# Patient Record
Sex: FEMALE | URBAN - METROPOLITAN AREA
[De-identification: names, ages, dates, MRNs, and addresses within clinical notes are randomized per-mention and may not be internally consistent; named-entity substitution may affect disease eponyms.]

---

## 2024-08-14 ENCOUNTER — LAB REQUISITION (OUTPATIENT)
Dept: LAB | Facility: HOSPITAL | Age: 29
End: 2024-08-14

## 2024-08-14 DIAGNOSIS — N89.9 NONINFLAMMATORY DISORDER OF VAGINA, UNSPECIFIED: ICD-10-CM

## 2024-08-14 PROCEDURE — 87086 URINE CULTURE/COLONY COUNT: CPT

## 2024-08-15 LAB
BACTERIA UR CULT: NORMAL
CLUE CELLS VAG LPF-#/AREA: PRESENT /[LPF]
NUGENT SCORE: 8
YEAST VAG WET PREP-#/AREA: ABNORMAL

## 2024-12-26 ENCOUNTER — OFFICE VISIT (OUTPATIENT)
Dept: URGENT CARE | Age: 29
End: 2024-12-26
Payer: COMMERCIAL

## 2024-12-26 VITALS
SYSTOLIC BLOOD PRESSURE: 114 MMHG | TEMPERATURE: 98.7 F | HEART RATE: 103 BPM | RESPIRATION RATE: 16 BRPM | DIASTOLIC BLOOD PRESSURE: 65 MMHG | OXYGEN SATURATION: 100 %

## 2024-12-26 DIAGNOSIS — R05.1 ACUTE COUGH: ICD-10-CM

## 2024-12-26 DIAGNOSIS — J01.90 ACUTE SINUSITIS, RECURRENCE NOT SPECIFIED, UNSPECIFIED LOCATION: Primary | ICD-10-CM

## 2024-12-26 RX ORDER — AMOXICILLIN AND CLAVULANATE POTASSIUM 875; 125 MG/1; MG/1
875 TABLET, FILM COATED ORAL 2 TIMES DAILY
Qty: 14 TABLET | Refills: 0 | Status: SHIPPED | OUTPATIENT
Start: 2024-12-26 | End: 2025-01-02

## 2024-12-26 RX ORDER — BENZONATATE 200 MG/1
200 CAPSULE ORAL 3 TIMES DAILY PRN
Qty: 21 CAPSULE | Refills: 0 | Status: SHIPPED | OUTPATIENT
Start: 2024-12-26 | End: 2025-01-02

## 2024-12-26 NOTE — PROGRESS NOTES
Subjective   Patient ID: Tayler Chapman is a 29 y.o. female. They present today with a chief complaint of Headache and Nasal Congestion.    History of Present Illness  Tayler is a 29-year-old female who presents to the urgent care for evaluation of cough, sinus headache, Sinus congestion and nasal congestion for about a week.  Patient states she typically gets sinus infections that presents similar to this however now she feels the sinus congestion is causing her to have sinus headaches and some neck tension.  Patient denies any neck stiffness or visual disturbances or light sensitivity however.  Patient denies of fever or chest pain or difficulty breathing.  Patient notes sinus drainage leading to throat irritation and cough.  Patient is requesting evaluation reassurance.  Patient has attempted use of Flonase with minimal improvement of symptoms.  No other symptoms or concerns otherwise reported.    Past Medical History  Allergies as of 12/26/2024    (No Known Allergies)       (Not in a hospital admission)       No past medical history on file.    No past surgical history on file.         Review of Systems  A 10-point review of systems was performed, otherwise unremarkable unless stated in the history of present illness.                Objective    Vitals:    12/26/24 0857   BP: 114/65   Pulse: 103   Resp: 16   Temp: 37.1 °C (98.7 °F)   SpO2: 100%     No LMP recorded.    Gen: Vitals noted and reviewed, no evidence of acute distress, well developed and afebrile.   Psych: Mood and affect appropriate for setting.  Head/Face: Atraumatic and normocephalic.   Neuro: No focal deficits noted.  ENT: TMs clear bilaterally, EACs unremarkable. Mastoids non-tender. Posterior oropharynx without erythema, exudate, or swelling. Uvula is in the midline and non-edematous.   Neck: Supple. No meningismus through full range of motion. No lymphadenopathy.   Cardiac: Regular rate and rhythm no murmur.   Lungs: Clear to auscultation  throughout, No evidence of wheezing, rhonchi or crackles. Good aeration throughout.   Extremities: Symmetrical, No peripheral edema  Skin: Without evidence of ecchymosis, wounds, or rashes.      Point of Care Test & Imaging Results from this visit  No results found for this visit on 12/26/24.   No results found.    Diagnostic study results (if any) were reviewed by [facility].    Assessment/Plan   Allergies, medications, history, and pertinent labs/EKGs/Imaging reviewed by James Ying DO.     Medical Decision Making  Discussed with the patient symptoms and clinical presentation findings suggestive of an acute sinusitis likely secondary viral infection of unclear etiology.  We advise close symptom monitoring supportive treatment.  However given the patient's duration of symptoms and lack of improvement with supportive care we agreed to initiate antimicrobial coverage and prescribed Augmentin.  We also prescribed Tessalon Perles to aid in cough symptom relief. Follow up with PCP. We advised seeking immediate emergency medical attention if symptoms fail to improve, worsen or any concerning symptoms arise. Patient voiced full understanding and agreement to plan.      Orders and Diagnoses  Diagnoses and all orders for this visit:  Acute sinusitis, recurrence not specified, unspecified location  -     benzonatate (Tessalon) 200 mg capsule; Take 1 capsule (200 mg) by mouth 3 times a day as needed for cough for up to 7 days. Do not crush or chew.  -     amoxicillin-pot clavulanate (Augmentin) 875-125 mg tablet; Take 1 tablet (875 mg) by mouth 2 times a day for 7 days. Take with food.  Acute cough  -     benzonatate (Tessalon) 200 mg capsule; Take 1 capsule (200 mg) by mouth 3 times a day as needed for cough for up to 7 days. Do not crush or chew.  -     amoxicillin-pot clavulanate (Augmentin) 875-125 mg tablet; Take 1 tablet (875 mg) by mouth 2 times a day for 7 days. Take with food.      Medical Admin  Record      Patient disposition: Home    Electronically signed by [facility]  9:23 AM

## 2025-01-01 ENCOUNTER — OFFICE VISIT (OUTPATIENT)
Dept: URGENT CARE | Age: 30
End: 2025-01-01
Payer: COMMERCIAL

## 2025-01-01 VITALS
HEART RATE: 98 BPM | DIASTOLIC BLOOD PRESSURE: 82 MMHG | SYSTOLIC BLOOD PRESSURE: 132 MMHG | RESPIRATION RATE: 16 BRPM | TEMPERATURE: 98.7 F | OXYGEN SATURATION: 96 %

## 2025-01-01 DIAGNOSIS — N76.0 ACUTE VAGINITIS: ICD-10-CM

## 2025-01-01 DIAGNOSIS — Z11.3 ROUTINE SCREENING FOR STI (SEXUALLY TRANSMITTED INFECTION): ICD-10-CM

## 2025-01-01 DIAGNOSIS — Z71.1 CONCERN ABOUT STD IN FEMALE WITHOUT DIAGNOSIS: Primary | ICD-10-CM

## 2025-01-01 LAB
POC BILIRUBIN, URINE: NEGATIVE
POC BLOOD, URINE: NEGATIVE
POC GLUCOSE, URINE: NEGATIVE MG/DL
POC KETONES, URINE: ABNORMAL MG/DL
POC LEUKOCYTES, URINE: NEGATIVE
POC NITRITE,URINE: NEGATIVE
POC PH, URINE: 6 PH
POC PROTEIN, URINE: NEGATIVE MG/DL
POC SPECIFIC GRAVITY, URINE: 1.02
POC UROBILINOGEN, URINE: 0.2 EU/DL
PREGNANCY TEST URINE, POC: NEGATIVE

## 2025-01-01 PROCEDURE — 87591 N.GONORRHOEAE DNA AMP PROB: CPT

## 2025-01-01 PROCEDURE — 87661 TRICHOMONAS VAGINALIS AMPLIF: CPT

## 2025-01-01 PROCEDURE — 87491 CHLMYD TRACH DNA AMP PROBE: CPT

## 2025-01-01 RX ORDER — FLUCONAZOLE 150 MG/1
150 TABLET ORAL ONCE
Qty: 1 TABLET | Refills: 0 | Status: SHIPPED | OUTPATIENT
Start: 2025-01-01 | End: 2025-01-01

## 2025-01-01 NOTE — PATIENT INSTRUCTIONS
Concern for STD    -If not already treated, you will be notified of any positive test result from today's visit.  -Pending test results should be available in 48 to 72 hours.  -You can also view your results online via [facility]    PLAN:  1. If prescribed medication, you must remain abstinent for 10 days after beginning the antibiotic, or you can reinfect yourself or your partner.  2.  If not already treated, you will be notified of any positive test result from your visit.  3. If you do not improve or you begin to have worsening symptoms please follow-up with your primary care physician  4. Please see your primary care physician if you would like to be tested for other diseases such as syphilis, HIV, AIDS, and hepatitis.  5. Return to the emergency department if you develop worsening of symptoms: Pain in your pelvis or lower abdomen, Fever or chills, or continued discharge.    Note:  IF you have been prescribed metronidazole.  Do not drink alcoholic beverages with this medication because it will cause nausea and vomiting.    Call your doctor or go to the emergency department for worsening symptoms.  -Fever begins or worsens  -If you are feeling more ill  -If you develop back or flank pain.  -Unable to urinate  -New or worsening abdominal pain    Sexually-transmitted diseases:    -Sexually transmitted diseases may be caused by bacteria, viruses, or protozoa.  -Some infections can be spread through kissing or close body contact.  -Some infections may spread to other parts of the body, sometimes with serious consequences.  -Using condoms can help prevent these infections.  -Most sexually transmitted diseases can be effectively treated with drugs.  -Sexual intercourse provides an easy opportunity for organisms to spread (be transmitted) from one person to another because it involves close contact and transfer of genital and other body fluids.  -Sexually transmitted diseases (STDs), also called sexually transmitted  infections (STIs), are relatively common. For example, an estimated 20 million new cases of STDs occur each year in the [place]. About half of the new cases occur in people aged 15 to 24 years  -Most STDs can be effectively treated with drugs (antibiotics for bacterial infections and antiviral drugs for viral infections). However, some new strains of bacteria and viruses have become resistant to some drugs, making treatment more difficult. Resistance to drugs  is likely to increase because drugs are sometimes misused.  -People who are being treated for a bacterial STD should abstain from sexual intercourse until the infection has been eliminated from them and their sex partners.   -Thus, sex partners should be tested and treated simultaneously.  -Viral STDs, especially herpes and HIV infection, usually persist for life. Antiviral drugs can control, but not yet cure these infections.    Symptoms of sexually transmitted infection:  Genital itching, burning, sores, discharge.  Be aware that many STIs do not cause any symptoms.      Prevention of STI's:   The best way to know for sure is to be tested.  There is no sure way to prevent all STIs, but there are things you can do to reduce your chances of catching 1.  The most important thing you can do is wear condoms every time you have sex.  Ask your doctor if there are any vaccines you should have.  If you are 26 years or younger you can get a vaccine to protect against HPV.  HPV causes genital warts and sometimes cancer.  If you do not have hepatitis A or B and not have gotten a vaccine, you can get those vaccines, too.  Ask your doctor if there are any vaccines he or she recommends.

## 2025-01-01 NOTE — PROGRESS NOTES
Subjective   Patient ID: Tayler Chapman is a 29 y.o. female. They present today with a chief complaint of STI Screening.    CC:  Concern for STD    HPI: This is a 29-year-old female presenting for concern for an STD.  Reports slight vaginal irritation and itching since taking amoxicillin about a week ago.  She is denying having discharge but would like to be tested for STD.  No dysuria, hematuria, urinary frequency or urgency.  No vaginal lesions.  No nausea or vomiting.  No abdominal pain.  No no fever.  No other concerns or complaints.    Past Medical History  Allergies as of 01/01/2025    (No Known Allergies)       (Not in a hospital admission)      No past medical history on file.    No past surgical history on file.         Review of Systems  Review of Systems    After reviewing all body systems I have documented pertinent findings above in the history.  All other Systems reviewed and are negative for complaint.  Pertinent positive and negatives are listed in the above HPI.    Objective    Vitals:    01/01/25 1042   BP: 132/82   Pulse: 98   Resp: 16   Temp: 37.1 °C (98.7 °F)   SpO2: 96%     No LMP recorded.    Physical Exam    EXAM:  General: No acute distress. Well developed, well nourished.   Skin: Skin is warm, and dry. No rashes or lesions. Nailbeds pink.  Cardiac: Regular rate  Respiratory:  No acute respiratory distress.  Regular rate of breathing  Abdomen:  Soft, NT/ND.  No CVA tenderness.    Genital/Rectal: Deferred.  Patient denies having rash or lesions of the genitalia.    MSK: Good mobility and strength of the extremities.  No spasm.   Neurological: A&Ox4. Normal speech, steady gait.   Psych:  Normal affect.  Cooperative.      Procedures  Point of Care Test & Imaging Results from this visit  Results for orders placed or performed in visit on 01/01/25   POCT UA Automated manually resulted    Collection Time: 01/01/25 10:48 AM   Result Value Ref Range    POC Glucose, Urine NEGATIVE NEGATIVE mg/dl     POC Bilirubin, Urine NEGATIVE NEGATIVE    POC Ketones, Urine TRACE (A) NEGATIVE mg/dl    POC Specific Gravity, Urine 1.025 1.005 - 1.035    POC Blood, Urine NEGATIVE NEGATIVE    POC PH, Urine 6.0 No Reference Range Established PH    POC Protein, Urine NEGATIVE NEGATIVE mg/dl    POC Urobilinogen, Urine 0.2 0.2, 1.0 EU/DL    Poc Nitrite, Urine NEGATIVE NEGATIVE    POC Leukocytes, Urine NEGATIVE NEGATIVE   POCT pregnancy, urine manually resulted    Collection Time: 01/01/25 10:48 AM   Result Value Ref Range    Preg Test, Ur Negative Negative     No results found.  Diagnostic study results (if any) were reviewed by Cheng Harper PA-C.  Assessment/Plan   Allergies, medications, history, and pertinent labs/EKGs/Imaging reviewed by Cheng Harper PA-C.       MDM:  Pelvic exam performed with (PSR - ) in room.  No clinical evidence of PID, herpes, syphilis, balanitis, molluscum, and trauma.     Pelvic exam deferred.    Reports no vaginal irritation or lesions.    UA dip: No evidence of UTI  Urine pregnancy: Negative  Chlamydia, gonorrhea, trichomonas: Pending    Advised patient to obtain HIV, Syphilis and Hepatitis testing on an outpatient basis. Advised to use protection at all times.  Advised to refrain from sexual intercourse for 10 days and to have all partners treated/tested if results come back positive for STD. Cultures available in 2-3 days. Advised follow-up with PCP for reevaluation. Pt/family instructed to return if symptoms worsen or if new symptoms develop. Patient/family expressed understanding and consented to the above plan. No barriers of communication were apparent and I answered all questions.    Orders and Diagnoses  Diagnoses and all orders for this visit:  Concern about STD in female without diagnosis  Routine screening for STI (sexually transmitted infection)  -     POCT UA Automated manually resulted  -     POCT pregnancy, urine manually resulted  -     C. trachomatis / N. gonorrhoeae,  Amplified  -     Trichomonas vaginalis, Amplified  Acute vaginitis  -     fluconazole (Diflucan) 150 mg tablet; Take 1 tablet (150 mg) by mouth 1 time for 1 dose.        Patient disposition: Home    Electronically signed by Cheng Harper PA-C  10:58 AM

## 2025-01-02 LAB
C TRACH RRNA SPEC QL NAA+PROBE: NEGATIVE
N GONORRHOEA DNA SPEC QL PROBE+SIG AMP: NEGATIVE
T VAGINALIS RRNA SPEC QL NAA+PROBE: NEGATIVE